# Patient Record
Sex: FEMALE | Race: WHITE | NOT HISPANIC OR LATINO | Employment: FULL TIME | URBAN - METROPOLITAN AREA
[De-identification: names, ages, dates, MRNs, and addresses within clinical notes are randomized per-mention and may not be internally consistent; named-entity substitution may affect disease eponyms.]

---

## 2017-10-05 ENCOUNTER — GENERIC CONVERSION - ENCOUNTER (OUTPATIENT)
Dept: OTHER | Facility: OTHER | Age: 32
End: 2017-10-05

## 2018-01-12 NOTE — RESULT NOTES
Message   pt blood work are normal except her NA is slightly low make sure pt drink alot of fluid since low hydration can cause it and will repeat it in the future  Verified Results  (1) COMPREHENSIVE METABOLIC PANEL [id number] 12:80CH Gallito Reis    Order Number: [id number]     Test Name Result Flag Reference   GLUCOSE,RANDM 80 mg/dL     If the patient is fasting, the ADA then defines impaired fasting glucose as > 100 mg/dL and diabetes as > or equal to 123 mg/dL  SODIUM 132 mmol/L L 136-145   POTASSIUM 4 0 mmol/L  3 5-5 3   CHLORIDE 100 mmol/L  100-108   CARBON DIOXIDE 28 mmol/L  21-32   ANION GAP (CALC) 4 mmol/L  4-13   BLOOD UREA NITROGEN 10 mg/dL  5-25   CREATININE 0 86 mg/dL  0 60-1 30   Standardized to IDMS reference method   CALCIUM 8 4 mg/dL  8 3-10 1   BILI, TOTAL 0 52 mg/dL  0 20-1 00   ALK PHOSPHATAS 53 U/L     ALT (SGPT) 20 U/L  12-78   AST(SGOT) 14 U/L  5-45   ALBUMIN 3 9 g/dL  3 5-5 0   TOTAL PROTEIN 7 0 g/dL  6 4-8 2   eGFR Non-African American      >60 0 ml/min/1 73sq m   - Patient Instructions: This is a fasting blood test  Water,black tea or black  coffee only after 9:00pm the night before test Drink 2 glasses of water the morning of test   National Kidney Disease Education Program recommendations are as follows:  GFR calculation is accurate only with a steady state creatinine  Chronic Kidney disease less than 60 ml/min/1 73 sq  meters  Kidney failure less than 15 ml/min/1 73 sq  meters

## 2024-06-10 ENCOUNTER — CONSULT (OUTPATIENT)
Dept: NEUROLOGY | Facility: CLINIC | Age: 39
End: 2024-06-10
Payer: COMMERCIAL

## 2024-06-10 VITALS
WEIGHT: 129 LBS | TEMPERATURE: 97.8 F | HEART RATE: 96 BPM | BODY MASS INDEX: 21.49 KG/M2 | HEIGHT: 65 IN | DIASTOLIC BLOOD PRESSURE: 60 MMHG | SYSTOLIC BLOOD PRESSURE: 102 MMHG | OXYGEN SATURATION: 100 %

## 2024-06-10 DIAGNOSIS — G43.109 MIGRAINE AURA WITHOUT HEADACHE: ICD-10-CM

## 2024-06-10 DIAGNOSIS — F41.9 ANXIETY: ICD-10-CM

## 2024-06-10 DIAGNOSIS — G43.109 MIGRAINE WITH AURA AND WITHOUT STATUS MIGRAINOSUS, NOT INTRACTABLE: Primary | ICD-10-CM

## 2024-06-10 PROCEDURE — 99205 OFFICE O/P NEW HI 60 MIN: CPT | Performed by: STUDENT IN AN ORGANIZED HEALTH CARE EDUCATION/TRAINING PROGRAM

## 2024-06-10 RX ORDER — MULTIVIT-MIN/IRON FUM/FOLIC AC 7.5 MG-4
1 TABLET ORAL DAILY
COMMUNITY

## 2024-06-10 RX ORDER — HYDROXYZINE HYDROCHLORIDE 25 MG/1
25 TABLET, FILM COATED ORAL DAILY PRN
COMMUNITY
Start: 2024-02-20

## 2024-06-10 NOTE — PROGRESS NOTES
[facility]  Neurological Services Consult Note    Patient Name: Swetha Patel  : 1985    MRN: [id number]    CONSULTING PROVIDER:  Referral Self  No address on file      Assessment/Plan:  1. Migraine with aura and without status migrainosus, not intractable        2. Migraine aura without headache        3. Anxiety          Swetha is a very pleasant 38 year old woman with PMH anxiety presenting for ocular migraines.  Based on her description of her symptoms, I do agree that the symptoms are most consistent with recurrent migraine aura without headache, particularly given that the few times that she has had a headache following these events, the headaches have several characteristics consistent with migraine, namely photophobia, location, and duration.  Reassuringly, these are quite few and far between, and indeed, seem to have fairly clear triggers of significant stress and sleep deprivation.    We discussed the pathophysiology of migraine at length today, as well as typical treatment approach.  In her case specifically, discussed that given that the headaches themselves are quite rare and seem to have clear triggers, I am hopeful that avoiding said triggers will result in effective headache avoidance without requiring chronic preventative medication use.  When she does have a headache, discussed that effective abortive therapy, particularly when they are as infrequent as these, can include Tylenol (1000 mg) or NSAIDs (ibuprofen, Aleve).  She has no abnormalities on exam or per history that would indicate any intracranial pathology, thus I do not feel that any imaging is needed at this time.  Discussed that should the character, frequency, duration, etc. of her headaches change, she is to let me know and we may reevaluate at that time.       She will Return in about 6 months (around 12/10/2024).   In particular, this will be during the winter, when her last bout of headaches/symptoms  "were at their worst, thus we could reevaluate the necessity of pharmacologic management at that time.                                                               Thank you for allowing me to participate in the care of your patient.  If there are any questions regarding evaluation please feel free to reach out.       ________________________________________________________________________________________________    Subjective:  Chief Complaint   Patient presents with    Migraine     Ocular- started in the fall 2021  Did see the opth -[facility] eye ass.-2021       38 y.o. right - handed female with PMH anxiety presenting for \"ocular migraines.\"    Usually just the visual symptoms - vision would \"fracture\" for 10 minutes at most. Over winter were worse; chiropractor seems to have helped. Denies any true loss of vision, curtains over eye/vision, or similar.     Does note that when she was pregnant she was getting small flashes in her vision; still occurs rarely. Ophthalmologist was not sure what caused it.     Headaches are described as follows:  Has had headaches for: Fall 2021  Location: Bitemporal, occipital (going up the back).  Character: Pressure  Severity: 2-3/10  Frequency: Visual symptoms every other week up until March 2023; as of 6/2024 has not had one for ~2 months, last was April (woke with significant snot, had not slept well that night). 2-3 actual headaches since fall 2021.   Length: Visual; symptoms 5-10 minutes. Headaches would last 6-8 hours.   Associated and exacerbating symptoms: Neck pain (winter 2023/2024). Photosensitivity, denies nausea or phonophobia.  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 Does endorse significant anxiety.   Meals: 3 daily  Family Planning:  will be obtaining vasectomy    Medications:  Abortive:    Current: Tylenol (helped, rare)    Prior: None   Prophylactic:     Current: None    Prior: Denies            Current Outpatient Medications   Medication Sig Dispense Refill    hydrOXYzine HCL (ATARAX) 25 mg tablet Take 25 mg by mouth daily as needed      Multiple Vitamins-Minerals (multivitamin with minerals) tablet Take 1 tablet by mouth daily      NON FORMULARY Eye Vitamin supplement       No current facility-administered medications for this visit.       No Known Allergies    Past Medical History:   Diagnosis Date    Migraine Fall 2021    :   History reviewed. No pertinent surgical history.  Social History     Socioeconomic History    Marital status: /Civil Union     Spouse name: Not on file    Number of children: Not on file    Years of education: Not on file    Highest education level: Not on file   Occupational History    Not on file   Tobacco Use    Smoking status: Never    Smokeless tobacco: Never   Vaping Use    Vaping status: Never Used   Substance and Sexual Activity    Alcohol use: Yes     Alcohol/week: 1.0 standard drink of alcohol     Types: 1 Cans of beer per week    Drug use: Not Currently     Types: Marijuana     Comment: I was prescribed medical marijuana for my anxiety in 2020.    Sexual activity: Yes     Partners: Male     Birth control/protection: Condom Male   Other Topics Concern    Not on file   Social History Narrative    Not on file     Social Determinants of Health     Financial Resource Strain: Low Risk  (1/24/2024)    Received from [facility]    Overall Financial Resource Strain (CARDIA)     Difficulty of Paying Living Expenses: Not hard at all   Food Insecurity: No Food Insecurity (1/24/2024)    Received from [facility]    Hunger Vital Sign     Worried  "About Running Out of Food in the Last Year: Never true     Ran Out of Food in the Last Year: Never true   Transportation Needs: Unmet Transportation Needs (1/24/2024)    Received from [facility]    PRAPARE - Transportation     Lack of Transportation (Medical): Yes     Lack of Transportation (Non-Medical): No   Physical Activity: Not on file   Stress: Stress Concern Present (1/24/2024)    Received from [facility]    [employer] [place] [employer] - Occupational Stress Questionnaire     Feeling of Stress : To some extent   Social Connections: Feeling Socially Integrated (1/24/2024)    Received from [facility]    OASIS : Social Isolation     How often do you feel lonely or isolated from those around you?: Never   Intimate Partner Violence: Not At Risk (1/24/2024)    Received from [facility]    Humiliation, Afraid, Rape, and Kick questionnaire     Fear of Current or Ex-Partner: No     Emotionally Abused: No     Physically Abused: No     Sexually Abused: No   Housing Stability: Not on file      Family History   Problem Relation Age of Onset    Hypertension Mother     Hypertension Father     Heart failure Father     No Known Problems Brother        Review of Symptoms:      10-point system review completed, all of which are negative except as mentioned above.      Imaging/Procedures:   No pertinent neuroimaging.      Objective:  Physical Exam:      /60 (BP Location: Left arm, Patient Position: Sitting, Cuff Size: Standard)   Pulse 96   Temp 97.8 °F (36.6 °C) (Temporal)   Ht 5' 5\" (1.651 m)   Wt 58.5 kg (129 lb)   SpO2 100%   BMI 21.47 kg/m²      Gen: A&O x 4, NAD, cooperative  HEENT: NC/AT, EOMI, PERRL, mmm, no carotid bruits, neck supple, no meningeal signs  Chest: No evidence of respiratory distress, no accessory muscle use; no evidence of peripheral cyanosis   Abd: soft/NT/ND, +BS  Ext: no edema, no calf tenderness " b/l  Endo: no thyromegaly  Psych: no depression or anxiety apparent   Skin: no rashes or lesions    Neurologic Exam:  Mental Status: A&O x 4, NAD, speech clear, language fluent, comprehension intact, repetition and naming intact    Cranial Nerve Exam:   CN II-XII intact: No papilledema on fundoscopic examination, PERRL, VFF, no nystagmus, no gaze paresis, sensation V1-V3 intact b/l, muscles of facial expression symmetric; hearing intact to conversational tone, palate elevates symmetrically, shoulder elevation symmetric and tongue protrudes midline with movement side to side.    Motor Exam:       Strength 5/5 UE's/LE's b/l  Tone and bulk normal   No pronator drift    Deep Tendon Reflexes: 2/4 biceps, triceps, brachioradialis, patellar, and achilles b/l, flexor plantar responses b/l    Sensation: Intact light touch/temperature/pinprick/vibration UE's/LE's b/l    Coordination/Cerebellum:       Tremors--none      Rapidly alternating movements: no dysdiadochokinesia b/l                Heel-to-Shin: no dysmetria b/l      Finger-to-Nose: no dysmetria b/l    Gait and stance:      Gait: Normal casual, tiptoe, heel walk, and tandem gait.  No evidence of ataxia present.            I have spent a total time of >55 minutes on 06/10/24 in caring for this patient including Diagnostic results, Prognosis, Risks and benefits of tx options, Instructions for management, Patient and family education, Importance of tx compliance, Risk factor reductions, Documenting in the medical record, Reviewing / ordering tests, medicine, procedures  , and Obtaining or reviewing history  .      Electronically signed by:    Scooby Frank DO  Board-Certified Neurology and Sleep Medicine  [facility]  06/10/24

## 2024-06-10 NOTE — PROGRESS NOTES
Review of Systems   Constitutional:  Negative for chills and fever.   HENT:  Negative for ear pain and sore throat.    Eyes:  Negative for pain and visual disturbance.   Respiratory:  Negative for cough and shortness of breath.    Cardiovascular:  Negative for chest pain and palpitations.   Gastrointestinal:  Negative for abdominal pain and vomiting.   Genitourinary:  Negative for dysuria and hematuria.   Musculoskeletal:  Negative for arthralgias and back pain.   Skin:  Negative for color change and rash.   Neurological:  Positive for headaches (Occular Migraine). Negative for seizures and syncope.   All other systems reviewed and are negative.

## 2024-06-10 NOTE — PATIENT INSTRUCTIONS
"-I do feel that your presentation is consistent with migraines, more often migraine aura without headache.     -Lifestyle: Stay well hydrated, try to eat 3 meals per day, ensure proper sleep amount and timing.   -In your case especially, triggers appear to be primarily lack of sleep and significant stress    --Also try to keep a headache diary: this is a regular chronicle of the headaches, including details such as: character/type of pain, location on your head, severity (1-10), associated symptoms (light sensitivity, sound sensitivity, nausea, worsening with movement, etc.), and any triggers you could think of. Sometimes it can be helpful to document what you had to eat/drink that day, as many people can have food/ingestion-related migraine triggers.     -Abortive medication: This is a medication that you take to abort/get rid of a headache that \"breaks through\" the preventative medication.   --In your case, I would recommend using Tylenol and/or NSAIDs (Aleve, ibuprofen), as these can be very effective when headaches are as few and far between as yours.       -We will plan for a follow-up in ~6 months. Please reach out via [employer] sooner if needed or with any questions.   "

## 2024-08-15 ENCOUNTER — OFFICE VISIT (OUTPATIENT)
Dept: URGENT CARE | Facility: CLINIC | Age: 39
End: 2024-08-15
Payer: COMMERCIAL

## 2024-08-15 VITALS
SYSTOLIC BLOOD PRESSURE: 112 MMHG | OXYGEN SATURATION: 98 % | DIASTOLIC BLOOD PRESSURE: 74 MMHG | BODY MASS INDEX: 20.43 KG/M2 | WEIGHT: 122.6 LBS | RESPIRATION RATE: 18 BRPM | HEIGHT: 65 IN | HEART RATE: 104 BPM | TEMPERATURE: 101.3 F

## 2024-08-15 DIAGNOSIS — J06.9 VIRAL URI: ICD-10-CM

## 2024-08-15 DIAGNOSIS — R50.9 FEVER, UNSPECIFIED FEVER CAUSE: Primary | ICD-10-CM

## 2024-08-15 PROCEDURE — 99204 OFFICE O/P NEW MOD 45 MIN: CPT | Performed by: EMERGENCY MEDICINE

## 2024-08-15 RX ORDER — IBUPROFEN 600 MG/1
600 TABLET, FILM COATED ORAL ONCE
Status: COMPLETED | OUTPATIENT
Start: 2024-08-15 | End: 2024-08-15

## 2024-08-15 RX ORDER — ACETAMINOPHEN 325 MG/1
975 TABLET ORAL ONCE
Status: COMPLETED | OUTPATIENT
Start: 2024-08-15 | End: 2024-08-15

## 2024-08-15 RX ADMIN — IBUPROFEN 600 MG: 600 TABLET, FILM COATED ORAL at 12:35

## 2024-08-15 RX ADMIN — ACETAMINOPHEN 975 MG: 325 TABLET ORAL at 12:35

## 2024-08-15 NOTE — PROGRESS NOTES
[facility]        NAME: Swetha Patel is a 39 y.o. female  : 1985    MRN: [id number]  DATE: August 15, 2024  TIME: 12:50 PM    Assessment and Plan   Fever, unspecified fever cause [R50.9]  1. Fever, unspecified fever cause  acetaminophen (TYLENOL) tablet 975 mg    ibuprofen (MOTRIN) tablet 600 mg      2. Viral URI          Patient febrile but nontoxic and otherwise well-appearing.  States she took a home COVID test which was negative.  On exam, mild postnasal drip noted and nasal congestion suggestive of viral URI.  Patient was otherwise without focal abdominal tenderness, headaches or meningeal signs.  No other source of infection noted on exam or history.  Advised continued supportive care at home to include use of oral and nasal decongestants, Motrin and Tylenol as needed for pain and fever, rest and fluids.  Advised patient to seek care in ED if she develops significantly worsening symptoms, otherwise follow-up closely with PCP as directed.  All questions were answered at bedside, patient was amenable to plan and voiced understanding.    Patient Instructions       Follow up with PCP in 3-5 days.  Proceed to  ER if symptoms worsen.    If tests have been performed at [facility], our office will contact you with results if changes need to be made to the care plan discussed with you at the visit.  You can review your full results on [facility] MyChart.    Chief Complaint     Chief Complaint   Patient presents with    Fever     Started 2 days ago, reports generalized body aches, also reports left ear pain, denies other symptoms, tmax: 101, last meds: last night          History of Present Illness       39-year-old female with history of migraine headaches presents with chief complaint of fever of approximately 101 °F at home Tmax with onset of 2 days ago.  Patient also endorses some generalized bodyaches and right ear discomfort and fullness however denies cough, nasal congestion, sore throat,  abdominal pain, dysuria, vaginal discharge, or nausea vomiting or diarrhea.  Patient has small children at home however they do not attend  and there were no noted sick contacts.    Fever  This is a new problem. The current episode started in the past 7 days. The problem occurs intermittently. The problem has been waxing and waning. Associated symptoms include a fever and myalgias. Pertinent negatives include no abdominal pain, anorexia, arthralgias, change in bowel habit, chest pain, chills, congestion, coughing, diaphoresis, fatigue, headaches, joint swelling, nausea, neck pain, numbness, rash, sore throat, swollen glands, urinary symptoms, vertigo, visual change, vomiting or weakness. Nothing aggravates the symptoms. She has tried acetaminophen and NSAIDs for the symptoms. The treatment provided mild relief.       Review of Systems   Review of Systems   Constitutional:  Positive for fever. Negative for activity change, appetite change, chills, diaphoresis, fatigue and unexpected weight change.   HENT:  Negative for congestion, dental problem, drooling, ear discharge, ear pain, facial swelling, hearing loss, mouth sores, nosebleeds, postnasal drip, rhinorrhea, sinus pressure, sinus pain, sneezing, sore throat, tinnitus, trouble swallowing and voice change.    Eyes:  Negative for pain and visual disturbance.   Respiratory:  Negative for apnea, cough, choking, chest tightness, shortness of breath, wheezing and stridor.    Cardiovascular:  Negative for chest pain and palpitations.   Gastrointestinal:  Negative for abdominal distention, abdominal pain, anal bleeding, anorexia, blood in stool, change in bowel habit, constipation, diarrhea, nausea, rectal pain and vomiting.   Genitourinary:  Negative for dysuria and hematuria.   Musculoskeletal:  Positive for myalgias. Negative for arthralgias, back pain, gait problem, joint swelling, neck pain and neck stiffness.   Skin:  Negative for color change and rash.  "  Neurological:  Negative for dizziness, vertigo, tremors, seizures, syncope, facial asymmetry, speech difficulty, weakness, light-headedness, numbness and headaches.   All other systems reviewed and are negative.        Current Medications       Current Outpatient Medications:     hydrOXYzine HCL (ATARAX) 25 mg tablet, Take 25 mg by mouth daily as needed, Disp: , Rfl:     Multiple Vitamins-Minerals (multivitamin with minerals) tablet, Take 1 tablet by mouth daily, Disp: , Rfl:     NON FORMULARY, Eye Vitamin supplement, Disp: , Rfl:   No current facility-administered medications for this visit.    Current Allergies     Allergies as of 08/15/2024    (No Known Allergies)            The following portions of the patient's history were reviewed and updated as appropriate: allergies, current medications, past family history, past medical history, past social history, past surgical history and problem list.     Past Medical History:   Diagnosis Date    Migraine Fall 2021       History reviewed. No pertinent surgical history.    Family History   Problem Relation Age of Onset    Hypertension Mother     Hypertension Father     Heart failure Father     No Known Problems Brother          Medications have been verified.        Objective   /74 (BP Location: Right arm, Patient Position: Sitting)   Pulse 104   Temp (!) 101.3 °F (38.5 °C) (Tympanic)   Resp 18   Ht 5' 5\" (1.651 m)   Wt 55.6 kg (122 lb 9.6 oz)   LMP 08/15/2024 (Exact Date)   SpO2 98%   BMI 20.40 kg/m²   Patient's last menstrual period was 08/15/2024 (exact date).       Physical Exam     Physical Exam  Vitals and nursing note reviewed.   Constitutional:       General: She is not in acute distress.     Appearance: Normal appearance. She is normal weight. She is not ill-appearing, toxic-appearing or diaphoretic.   HENT:      Head: Normocephalic and atraumatic.      Right Ear: Tympanic membrane, ear canal and external ear normal. There is no impacted " cerumen.      Left Ear: Tympanic membrane, ear canal and external ear normal. There is no impacted cerumen.      Nose: Congestion present. No rhinorrhea.      Mouth/Throat:      Lips: No lesions.      Mouth: Mucous membranes are moist. No injury, lacerations, oral lesions or angioedema.      Dentition: Normal dentition. Does not have dentures. No dental tenderness, gingival swelling, dental caries, dental abscesses or gum lesions.      Tongue: No lesions. Tongue does not deviate from midline.      Palate: No mass and lesions.      Pharynx: Oropharynx is clear. Uvula midline. Posterior oropharyngeal erythema and postnasal drip present. No pharyngeal swelling, oropharyngeal exudate or uvula swelling.      Tonsils: No tonsillar exudate or tonsillar abscesses. 0 on the right. 0 on the left.   Eyes:      General:         Right eye: No discharge.         Left eye: No discharge.      Extraocular Movements: Extraocular movements intact.      Conjunctiva/sclera: Conjunctivae normal.      Pupils: Pupils are equal, round, and reactive to light.   Cardiovascular:      Rate and Rhythm: Regular rhythm. Tachycardia present.      Pulses: Normal pulses.      Heart sounds: Normal heart sounds. No murmur heard.     No gallop.   Pulmonary:      Effort: Pulmonary effort is normal. No respiratory distress.      Breath sounds: Normal breath sounds. No stridor. No wheezing, rhonchi or rales.   Chest:      Chest wall: No tenderness.   Abdominal:      General: There is no distension.      Palpations: There is no mass.      Tenderness: There is no abdominal tenderness. There is no right CVA tenderness, left CVA tenderness, guarding or rebound.      Hernia: No hernia is present.   Musculoskeletal:         General: No swelling, tenderness, deformity or signs of injury. Normal range of motion.      Cervical back: Normal range of motion and neck supple.      Right lower leg: No edema.      Left lower leg: No edema.   Skin:     Coloration: Skin is  not jaundiced or pale.      Findings: No bruising, erythema, lesion or rash.   Neurological:      General: No focal deficit present.      Mental Status: She is alert and oriented to person, place, and time.      Cranial Nerves: No cranial nerve deficit.      Sensory: No sensory deficit.      Motor: No weakness.      Coordination: Coordination normal.      Gait: Gait normal.   Psychiatric:         Mood and Affect: Mood normal.         Behavior: Behavior normal.

## 2024-12-10 ENCOUNTER — OFFICE VISIT (OUTPATIENT)
Dept: NEUROLOGY | Facility: CLINIC | Age: 39
End: 2024-12-10
Payer: COMMERCIAL

## 2024-12-10 VITALS
OXYGEN SATURATION: 99 % | HEART RATE: 74 BPM | HEIGHT: 65 IN | WEIGHT: 126.1 LBS | DIASTOLIC BLOOD PRESSURE: 58 MMHG | BODY MASS INDEX: 21.01 KG/M2 | SYSTOLIC BLOOD PRESSURE: 96 MMHG | TEMPERATURE: 97.6 F

## 2024-12-10 DIAGNOSIS — G43.109 MIGRAINE WITH AURA AND WITHOUT STATUS MIGRAINOSUS, NOT INTRACTABLE: Primary | ICD-10-CM

## 2024-12-10 DIAGNOSIS — F41.9 ANXIETY: ICD-10-CM

## 2024-12-10 DIAGNOSIS — G43.109 MIGRAINE AURA WITHOUT HEADACHE: ICD-10-CM

## 2024-12-10 PROCEDURE — 99214 OFFICE O/P EST MOD 30 MIN: CPT | Performed by: STUDENT IN AN ORGANIZED HEALTH CARE EDUCATION/TRAINING PROGRAM

## 2024-12-10 RX ORDER — RIZATRIPTAN BENZOATE 10 MG/1
10 TABLET ORAL AS NEEDED
Qty: 9 TABLET | Refills: 2 | Status: SHIPPED | OUTPATIENT
Start: 2024-12-10

## 2024-12-10 NOTE — PATIENT INSTRUCTIONS
"-I do feel that your presentation is consistent with migraines, more often migraine aura without headache.     -Lifestyle: Stay well hydrated, try to eat 3 meals per day, ensure proper sleep amount and timing.              -In your case especially, triggers appear to be primarily lack of sleep and significant stress     --Also try to keep a headache diary: this is a regular chronicle of the headaches, including details such as: character/type of pain, location on your head, severity (1-10), associated symptoms (light sensitivity, sound sensitivity, nausea, worsening with movement, etc.), and any triggers you could think of. Sometimes it can be helpful to document what you had to eat/drink that day, as many people can have food/ingestion-related migraine triggers.     -Abortive medication: This is a medication that you take to abort/get rid of a headache that \"breaks through\" the preventative medication.   --In your case, I would recommend using Tylenol and/or NSAIDs (Aleve, ibuprofen), as these can be very effective when headaches are as few and far between as yours.   --I have also prescribed a medication called Maxalt (rizatriptan) - you can also trial this to break the headache   --1 tablet at the onset of headache, may take 1 more ~2 hours afterwards if the headache remains or has returned.No more than 2 in 24 hours, no more than 9 per month.    Let me know if you ever feel that these headaches are increasing to the point that you would need a preventative medication.     -We will plan for a follow-up in ~6 months. Please reach out via [facility] [employer] sooner if needed or with any questions  "

## 2024-12-10 NOTE — PROGRESS NOTES
Review of Systems   Constitutional:  Negative for appetite change, fatigue and fever.   HENT: Negative.  Negative for hearing loss, tinnitus, trouble swallowing and voice change.    Eyes:  Positive for visual disturbance (vision sees sparkles ( more during stress)). Negative for photophobia and pain.   Respiratory: Negative.  Negative for shortness of breath.    Cardiovascular: Negative.  Negative for palpitations.   Gastrointestinal: Negative.  Negative for nausea and vomiting.   Endocrine: Negative.  Negative for cold intolerance.   Genitourinary: Negative.  Negative for dysuria, frequency and urgency.   Musculoskeletal:  Negative for back pain, gait problem, myalgias, neck pain and neck stiffness.   Skin: Negative.  Negative for rash.   Allergic/Immunologic: Negative.    Neurological:  Positive for headaches (mild headaches pressure feeling (randomly)). Negative for dizziness, tremors, seizures, syncope, facial asymmetry, speech difficulty, weakness, light-headedness and numbness.   Hematological: Negative.  Does not bruise/bleed easily.   Psychiatric/Behavioral: Negative.  Negative for confusion, hallucinations and sleep disturbance.    All other systems reviewed and are negative.

## 2024-12-10 NOTE — PROGRESS NOTES
"Name: Swetha Patel      : 1985      MRN: [id number]  Encounter Provider: Scooby Frank DO  Encounter Date: 12/10/2024   Encounter department: NEUROLOGY [facility]  :  Assessment & Plan  Migraine with aura and without status migrainosus, not intractable  Swetha is a very pleasant 39-year-old woman with a PMHx of anxiety who presents in follow up for migraine, including migraine aura without headache.  It sounds as though she has not had any further full-blown migraine type headaches since her last visit with me, however is starting to have some recurrence of symptoms similar to those that she was dealing with last winter, and thus is nervous that her headaches may return.      For now, I feel it is reasonable to hold off on any prophylactic therapy, given how infrequent her migraines generally have been, and that there is no guarantee that she will have a recurrence now (particularly as she had had a time period of ~2 years between her headaches in the past)  We will continue to focus primarily on abortive therapy, at least for now.  She can certainly continue to utilize either Tylenol and/or NSAIDs, however I have also provided a prescription for Maxalt should she wish to trial this.  If her headaches start increasing in frequency and she wishes to try a preventative medication, she is to let me know.  I did encourage her to keep a headache diary  As of now, we will plan for a 6-month follow-up, however she is to reach out if she needs to be seen sooner.      Orders:    rizatriptan (Maxalt) 10 mg tablet; Take 1 tablet (10 mg total) by mouth as needed for migraine Take at the onset of migraine; if symptoms continue or return, may take another dose at least 2 hours after first dose. Take no more than 2 doses in a day.    Migraine aura without headache  She does continue to have somewhat infrequent \"sparkles\" in her vision that sound akin to migraine aura without headache, however has had no " "more severe \"fracturing\" of her vision since her last visit with me.  Management per migraine with aura plan.       Anxiety  Her headaches do sound to cause some degree of stress, however in turn, stress/anxiety may well be a significant trigger.  If this becomes severe, it may be beneficial for her to establish with psychology.                 ________________________________________________________________________________________________    Per Last Visit Note (Date: 6/10/2024):  Swetha is a very pleasant 38 year old woman with PMH anxiety presenting for ocular migraines.  Based on her description of her symptoms, I do agree that the symptoms are most consistent with recurrent migraine aura without headache, particularly given that the few times that she has had a headache following these events, the headaches have several characteristics consistent with migraine, namely photophobia, location, and duration.  Reassuringly, these are quite few and far between, and indeed, seem to have fairly clear triggers of significant stress and sleep deprivation.     We discussed the pathophysiology of migraine at length today, as well as typical treatment approach.  In her case specifically, discussed that given that the headaches themselves are quite rare and seem to have clear triggers, I am hopeful that avoiding said triggers will result in effective headache avoidance without requiring chronic preventative medication use.  When she does have a headache, discussed that effective abortive therapy, particularly when they are as infrequent as these, can include Tylenol (1000 mg) or NSAIDs (ibuprofen, Aleve).  She has no abnormalities on exam or per history that would indicate any intracranial pathology, thus I do not feel that any imaging is needed at this time.  Discussed that should the character, frequency, duration, etc. of her headaches change, she is to let me know and we may reevaluate at that time.       She will Return " "in about 6 months (around 12/10/2024).   In particular, this will be during the winter, when her last bout of headaches/symptoms were at their worst, thus we could reevaluate the necessity of pharmacologic management at that time.      Imaging/Other Studies:  *No pertinent neuroimaging*      ________________________________________________________________________________________________    Subjective:      Swetha Patel is a 39 y.o. female with a PMHx of anxiety who presents in follow up for migraine, including migraine aura without headache.    States some of the symptoms that had been present last winter have started up again: neck pain/pressure radiating up into her head. Feels like \"things have started to brew\" over the past few weeks.     Has not had \"fracturing\" of her vision since last visit, still having \"sparkles.\"    Has been taking more vitamin D, also had some adjustments from her \"neuro-chiropractor\" just yesterday.       Headaches are described as follows:  Has had headaches for: Fall 2021  Location: Bitemporal, occipital (going up the back).  Character: Pressure  Severity: 2-3/10  Frequency:   -Neck pain: Really just maybe 3x throughout the week last 2 weeks or so. Had her adjustment yesterday.    -Now tells me that she had maybe 1 headache per week from 2/2023-3/2023. These had been her first headaches since fall 2021.   -At last visit: Visual symptoms every other week up until March 2023; as of 6/2024 has not had one for ~2 months, last was April (woke with significant snot, had not slept well that night). 2-3 actual headaches since fall 2021 (winter 9176-4250).   Length: Visual; symptoms 5-10 minutes. Headaches would last 6-8 hours.   Associated and exacerbating symptoms: Neck pain (winter 2023/2024). Photosensitivity, denies nausea or phonophobia.   -Photosensitivity has been intermittent since last visit.  Aura: See above; winter 2023/2024 would be more \"traditional\" (squiggly line across " "vision)  Triggers: Neck tension, poor sleep      Water intake: Adequate  Caffeine Intake: 1 cup green tea daily  Sleep: Believes getting 6-7 hours nightly. Bedtime ~2300 (time to self after kids go to bed).  Stress: Ongoing; did have several stressful couple months in the fall, some teammates quit.  Meals: 3 daily  Family Planning:  vasectomy     Medications:  Abortive:    Current:   -Tylenol (helped, rare)      Prior:   -None     Prophylactic:     Current:   -None      Prior:   -Denies            Review of Systems I have personally reviewed the MA's review of systems and made changes as necessary.    Current Outpatient Medications on File Prior to Visit   Medication Sig Dispense Refill    hydrOXYzine HCL (ATARAX) 25 mg tablet Take 25 mg by mouth daily as needed      Multiple Vitamins-Minerals (multivitamin with minerals) tablet Take 1 tablet by mouth daily      NON FORMULARY Eye Vitamin supplement       No current facility-administered medications on file prior to visit.         Objective   BP 96/58 (BP Location: Left arm, Patient Position: Sitting, Cuff Size: Adult)   Pulse 74   Temp 97.6 °F (36.4 °C) (Temporal)   Ht 5' 5\" (1.651 m)   Wt 57.2 kg (126 lb 1.6 oz)   SpO2 99%   BMI 20.98 kg/m²     Physical Exam  Constitutional:       General: She is awake.      Appearance: Normal appearance.   HENT:      Head: Normocephalic and atraumatic.   Eyes:      General: Lids are normal.      Extraocular Movements: Extraocular movements intact.      Pupils: Pupils are equal, round, and reactive to light.   Cardiovascular:      Comments: No evidence of respiratory distress, no accessory muscle use; no evidence of peripheral cyanosis    Neurological:      Mental Status: She is alert.      Motor: Motor strength is normal.     Deep Tendon Reflexes:      Reflex Scores:       Tricep reflexes are 2+ on the right side and 2+ on the left side.       Bicep reflexes are 2+ on the right side and 2+ on the left side.       " Brachioradialis reflexes are 2+ on the right side and 2+ on the left side.       Patellar reflexes are 2+ on the right side and 2+ on the left side.       Achilles reflexes are 2+ on the right side and 2+ on the left side.  Psychiatric:         Behavior: Behavior is cooperative.       Neurological Exam  Mental Status  Awake and alert.    Cranial Nerves  CN II: Visual fields full to confrontation. Right funduscopic exam: disc intact. Left funduscopic exam: disc intact.  CN III, IV, VI: Extraocular movements intact bilaterally. Normal lids and orbits bilaterally. Pupils equal round and reactive to light bilaterally.  CN V: Facial sensation is normal.  CN VII: Full and symmetric facial movement.  CN VIII: Hearing is normal.  CN IX, X: Palate elevates symmetrically. Normal gag reflex.  CN XI: Shoulder shrug strength is normal.  CN XII: Tongue midline without atrophy or fasciculations.    Motor  Normal muscle bulk throughout. Strength is 5/5 throughout all four extremities.    Sensory  Light touch is normal in upper and lower extremities. Temperature is normal in upper and lower extremities.     Reflexes                                            Right                      Left  Brachioradialis                    2+                         2+  Biceps                                 2+                         2+  Triceps                                2+                         2+  Finger flex                           2+                         2+  Hamstring                            2+                         2+  Patellar                                2+                         2+  Achilles                                2+                         2+    Gait  Casual gait is normal including stance, stride, and arm swing.        Electronically signed by:    Scooby Frank DO  Board-Certified Neurology and Sleep Medicine  [facility]  12/10/24

## 2024-12-10 NOTE — ASSESSMENT & PLAN NOTE
Her headaches do sound to cause some degree of stress, however in turn, stress/anxiety may well be a significant trigger.  If this becomes severe, it may be beneficial for her to establish with psychology.

## 2024-12-10 NOTE — ASSESSMENT & PLAN NOTE
"She does continue to have somewhat infrequent \"sparkles\" in her vision that sound akin to migraine aura without headache, however has had no more severe \"fracturing\" of her vision since her last visit with me.  Management per migraine with aura plan.       "

## 2024-12-10 NOTE — ASSESSMENT & PLAN NOTE
Swetha is a very pleasant 39-year-old woman with a PMHx of anxiety who presents in follow up for migraine, including migraine aura without headache.  It sounds as though she has not had any further full-blown migraine type headaches since her last visit with me, however is starting to have some recurrence of symptoms similar to those that she was dealing with last winter, and thus is nervous that her headaches may return.      For now, I feel it is reasonable to hold off on any prophylactic therapy, given how infrequent her migraines generally have been, and that there is no guarantee that she will have a recurrence now (particularly as she had had a time period of ~2 years between her headaches in the past)  We will continue to focus primarily on abortive therapy, at least for now.  She can certainly continue to utilize either Tylenol and/or NSAIDs, however I have also provided a prescription for Maxalt should she wish to trial this.  If her headaches start increasing in frequency and she wishes to try a preventative medication, she is to let me know.  I did encourage her to keep a headache diary  As of now, we will plan for a 6-month follow-up, however she is to reach out if she needs to be seen sooner.      Orders:    rizatriptan (Maxalt) 10 mg tablet; Take 1 tablet (10 mg total) by mouth as needed for migraine Take at the onset of migraine; if symptoms continue or return, may take another dose at least 2 hours after first dose. Take no more than 2 doses in a day.

## 2025-07-26 ENCOUNTER — OFFICE VISIT (OUTPATIENT)
Dept: URGENT CARE | Facility: CLINIC | Age: 40
End: 2025-07-26
Payer: COMMERCIAL

## 2025-07-26 VITALS
DIASTOLIC BLOOD PRESSURE: 70 MMHG | RESPIRATION RATE: 18 BRPM | SYSTOLIC BLOOD PRESSURE: 120 MMHG | HEART RATE: 70 BPM | TEMPERATURE: 98.3 F | BODY MASS INDEX: 21.36 KG/M2 | HEIGHT: 65 IN | WEIGHT: 128.2 LBS | OXYGEN SATURATION: 99 %

## 2025-07-26 DIAGNOSIS — L23.7 POISON IVY: Primary | ICD-10-CM

## 2025-07-26 PROCEDURE — 99213 OFFICE O/P EST LOW 20 MIN: CPT | Performed by: PHYSICIAN ASSISTANT

## 2025-07-26 RX ORDER — HYDROCORTISONE 25 MG/G
CREAM TOPICAL 2 TIMES DAILY
Qty: 28 G | Refills: 0 | Status: SHIPPED | OUTPATIENT
Start: 2025-07-26

## 2025-07-26 RX ORDER — PREDNISONE 10 MG/1
TABLET ORAL
Qty: 12 TABLET | Refills: 0 | Status: SHIPPED | OUTPATIENT
Start: 2025-07-26

## 2025-07-26 NOTE — PROGRESS NOTES
"[facility]        NAME: Swetha Patel is a 40 y.o. female  : 1985    MRN: [id number]  DATE: 2025  TIME: 11:20 AM    /70   Pulse 70   Temp 98.3 °F (36.8 °C) (Tympanic)   Resp 18   Ht 5' 5\" (1.651 m)   Wt 58.2 kg (128 lb 3.2 oz)   SpO2 99%   BMI 21.33 kg/m²     Assessment and Plan   Poison ivy [L23.7]  1. Poison ivy  hydrocortisone 2.5 % cream    predniSONE 10 mg tablet            Patient Instructions       Follow up with PCP in 3-5 days.  Proceed to  ER if symptoms worsen.    Chief Complaint     Chief Complaint   Patient presents with    Poison Ivy     Poison ivy started on back of left leg 1 week ago. Spreading to other leg and arms.          History of Present Illness       Pt with poison ivy to arms legs torso x 1 week     Poison Ivy        Review of Systems   Review of Systems   Constitutional: Negative.    HENT: Negative.     Eyes: Negative.    Respiratory: Negative.     Cardiovascular: Negative.    Gastrointestinal: Negative.    Endocrine: Negative.    Genitourinary: Negative.    Musculoskeletal: Negative.    Skin:  Positive for rash.   Allergic/Immunologic: Negative.    Neurological: Negative.    Hematological: Negative.    Psychiatric/Behavioral: Negative.     All other systems reviewed and are negative.        Current Medications     Current Medications[1]    Current Allergies     Allergies as of 2025    (No Known Allergies)            The following portions of the patient's history were reviewed and updated as appropriate: allergies, current medications, past family history, past medical history, past social history, past surgical history and problem list.     Past Medical History[2]    Past Surgical History[3]    Family History[4]      Medications have been verified.        Objective   /70   Pulse 70   Temp 98.3 °F (36.8 °C) (Tympanic)   Resp 18   Ht 5' 5\" (1.651 m)   Wt 58.2 kg (128 lb 3.2 oz)   SpO2 99%   BMI 21.33 kg/m²        Physical Exam "     Physical Exam  Vitals and nursing note reviewed.   Constitutional:       Appearance: Normal appearance. She is normal weight.   HENT:      Head: Normocephalic and atraumatic.     Cardiovascular:      Rate and Rhythm: Normal rate and regular rhythm.      Pulses: Normal pulses.      Heart sounds: Normal heart sounds.   Pulmonary:      Effort: Pulmonary effort is normal.      Breath sounds: Normal breath sounds.   Abdominal:      Palpations: Abdomen is soft.     Musculoskeletal:         General: Normal range of motion.      Cervical back: Normal range of motion and neck supple.     Skin:     General: Skin is warm.      Capillary Refill: Capillary refill takes less than 2 seconds.      Comments: Poison ivy to arms legs torso      Neurological:      Mental Status: She is alert and oriented to person, place, and time.     Psychiatric:         Mood and Affect: Mood normal.                          [1]   Current Outpatient Medications:     hydrocortisone 2.5 % cream, Apply topically 2 (two) times a day, Disp: 28 g, Rfl: 0    hydrOXYzine HCL (ATARAX) 25 mg tablet, Take 25 mg by mouth daily as needed, Disp: , Rfl:     Multiple Vitamins-Minerals (multivitamin with minerals) tablet, Take 1 tablet by mouth in the morning., Disp: , Rfl:     NON FORMULARY, Eye Vitamin supplement, Disp: , Rfl:     predniSONE 10 mg tablet, 3 tabs po qd x 2 days then 2 tabs po qd x 2 days then 1 tab po qd x 2 days, Disp: 12 tablet, Rfl: 0    rizatriptan (Maxalt) 10 mg tablet, Take 1 tablet (10 mg total) by mouth as needed for migraine Take at the onset of migraine; if symptoms continue or return, may take another dose at least 2 hours after first dose. Take no more than 2 doses in a day. (Patient not taking: Reported on 7/26/2025), Disp: 9 tablet, Rfl: 2  [2]   Past Medical History:  Diagnosis Date    Migraine Fall 2021   [3] No past surgical history on file.  [4]   Family History  Problem Relation Name Age of Onset    Hypertension Mother       Hypertension Father      Heart failure Father      No Known Problems Brother